# Patient Record
Sex: FEMALE | Race: WHITE | NOT HISPANIC OR LATINO | URBAN - METROPOLITAN AREA
[De-identification: names, ages, dates, MRNs, and addresses within clinical notes are randomized per-mention and may not be internally consistent; named-entity substitution may affect disease eponyms.]

---

## 2018-10-17 ENCOUNTER — TELEPHONE (OUTPATIENT)
Dept: TRANSPLANT | Facility: CLINIC | Age: 43
End: 2018-10-17

## 2018-10-17 DIAGNOSIS — Z00.5 TRANSPLANT DONOR EVALUATION: Primary | ICD-10-CM

## 2018-10-17 NOTE — TELEPHONE ENCOUNTER
"[facility] B[facility]ZE  [id number]      LIVING KIDNEY DONOR EVALUATION  Donor First Name María Donor MRN    Donor Last Name Gil Completed 10/14/2018 1:34 PM    1975 Record ID [id number]   BREEZE Screen PASSED     Intended Recipient  Recipient First Name Kena Recipient MRN    Recipient Last Name Cuauhtemoc Relationship Niece or Nephew   Recipient  1947 Recipient Diagnosis    Recipient's ABO      Donor Information  Age 42 Gender Female   Ht 163 cm (5' 4'') Race    Wt 52.1 kg (115 lbs) Ethnicity Not /   BMI 19.80 kg/m  Preferred Language English      Required No     Blood Type O   Demographics  Home Address [address] [place] [address] # [phone]   [facility] Alternate # [phone]   Zip Code [address] Type Home   Country [place] Preferred Contact day Tue   Email [email or web address] Preferred Contact time 09:00 AM-11:00 AM   &&   Donor's Medical Information  Medical History \"Mild anxiety\"   History of miscarriage   History of pregnancy   other (\"Hormonal acne\")   other (\"Mild anxiety\") Medications B complex vitamin   Chondroitin   Citalopram   Fish Oil   Glucosamine   Spironolactone   Surgical History Dilation and Curettage   Tonsillectomy Allergies NKDA   Social History EtOH: Daily (1-2 drinks/day)   Illicit Drug Use: Denies   Tobacco: Denies Self-Reported Functional Status \"I am able to participate in strenuous sports such as swimming, singles tennis, football, basketball, or skiing\"   Family Medical History Cancer (Aunt or Uncle)   Diabetes (Grandparent)   Heart Disease (Grandparent, Aunt or Uncle)   Hypertension (Father, Sibling)   Kidney Disease (denies)   Kidney Stones (denies) Exercise Frequency Exercise (3 X per week)   Review of Organ Systems  Review of Systems Airway or Lungs: No   Blood Disorder: No   Cancer: No   Diabetes,Thyroid,Adrenal,Endocrine Disorder: No   Digestive or Liver: No   Female Health: Yes "   Heart or Circulatory System: No   Immune Diseases: No   Kidneys and Bladder: No   Muscles,Bones,Joints: No   Neuro: No   Psych: Yes   &&   Donor's Social Information  Marital Status  Living Accommodation Owns own home/apartment   Level of Education Graduate or professional degree complete Living Arrangement With spouse   Employment Status Unemployed Concerns: health and life insurance No   Employer  Concerns: job security and lost income No   Occupation      Medical Insurance Status Has medical insurance     High Risk Behavior  High Risk Behaviors Blood transfusion < 12 months. (NO)   Commercial sex < 12 months. (NO)   Illicit IV drug use < 5yrs. (NO)   Other high risk sexual contact < 12 months. (NO)   Reason for Donation  Referral Tx Candidate Reason for Donation My aunt needs a kidney. She is a wonderful person and I would like to provide her with a healthy organ if I am able.   Permission to Disclose Inquiry Yes Patient Comments    Donor Motivation Level Highly motivated donor     PCP Contact  PCP Name Sudhir Gore   PCP [facility]   PCP Phone [phone]   Emergency Contact  First Name Darien First Name Odette   Last Name Gil Last Name Leda   Phone # [phone] Phone # [phone]   Phone Type Mobile Phone Type Mobile   Relationship Spouse Relationship Mother   Office Use  Reviewed By    Reviewed 10/15/2018 11:00 AM   Admin Folder Accept   Comments 10/15 - Passed Eval   Lost for Followup    Extended Comments    [facility] ID [email or web address]:[id number] survey status completed   Activity History  Call  Task    Due Date 10/16/2018   Last Modified Date/Time 10/16/2018 1:38 PM   Comments    Call  Task    Due Date 10/16/2018   Last Modified Date/Time 10/16/2018 9:52 AM   Comments

## 2018-10-17 NOTE — TELEPHONE ENCOUNTER
Is abo O. Has 2 glasses of wine every day.Takes Spironolactone for acne. Will send Phase 1 orders/pkt.

## 2018-11-12 ENCOUNTER — DOCUMENTATION ONLY (OUTPATIENT)
Dept: TRANSPLANT | Facility: CLINIC | Age: 43
End: 2018-11-12